# Patient Record
Sex: FEMALE | Race: WHITE | NOT HISPANIC OR LATINO | Employment: OTHER | URBAN - METROPOLITAN AREA
[De-identification: names, ages, dates, MRNs, and addresses within clinical notes are randomized per-mention and may not be internally consistent; named-entity substitution may affect disease eponyms.]

---

## 2021-05-28 ENCOUNTER — PREPPED CHART (OUTPATIENT)
Dept: URBAN - METROPOLITAN AREA CLINIC 19 | Facility: CLINIC | Age: 56
End: 2021-05-28

## 2022-01-20 ASSESSMENT — VISUAL ACUITY
OD_CC: 20/40
OS_CC: 20/30-1

## 2022-01-20 ASSESSMENT — TONOMETRY
OD_IOP_MMHG: 15
OS_IOP_MMHG: 16

## 2022-01-21 ENCOUNTER — FOLLOW UP (OUTPATIENT)
Dept: URBAN - METROPOLITAN AREA CLINIC 19 | Facility: CLINIC | Age: 57
End: 2022-01-21

## 2022-01-21 DIAGNOSIS — H25.13: ICD-10-CM

## 2022-01-21 DIAGNOSIS — H35.373: ICD-10-CM

## 2022-01-21 PROCEDURE — 92134 CPTRZ OPH DX IMG PST SGM RTA: CPT

## 2022-01-21 PROCEDURE — 92014 COMPRE OPH EXAM EST PT 1/>: CPT

## 2022-01-21 ASSESSMENT — VISUAL ACUITY
OD_CC: 20/40-1
OS_CC: 20/25-1
OU_CC: 20/20-1

## 2022-01-21 ASSESSMENT — TONOMETRY
OS_IOP_MMHG: 19
OD_IOP_MMHG: 16

## 2022-02-18 ENCOUNTER — CONSULTATION/EVALUATION (OUTPATIENT)
Dept: URBAN - METROPOLITAN AREA CLINIC 19 | Facility: CLINIC | Age: 57
End: 2022-02-18

## 2022-02-18 DIAGNOSIS — H43.813: ICD-10-CM

## 2022-02-18 DIAGNOSIS — H35.373: ICD-10-CM

## 2022-02-18 DIAGNOSIS — H25.813: ICD-10-CM

## 2022-02-18 PROCEDURE — 92134 CPTRZ OPH DX IMG PST SGM RTA: CPT

## 2022-02-18 PROCEDURE — 92014 COMPRE OPH EXAM EST PT 1/>: CPT

## 2022-02-18 ASSESSMENT — TONOMETRY
OS_IOP_MMHG: 16
OD_IOP_MMHG: 11

## 2022-02-18 ASSESSMENT — VISUAL ACUITY
OS_CC: 20/40
OS_SC: 20/400
OD_CC: 20/40
OD_SC: 20/400
OU_SC: 20/400
OU_CC: 20/40

## 2022-02-18 ASSESSMENT — KERATOMETRY
OD_AXISANGLE_DEGREES: 014
OS_K2POWER_DIOPTERS: 44.50
OD_K1POWER_DIOPTERS: 43.50
OD_K2POWER_DIOPTERS: 44.25
OS_K1POWER_DIOPTERS: 43.25
OS_AXISANGLE2_DEGREES: 75
OS_AXISANGLE_DEGREES: 165
OD_AXISANGLE2_DEGREES: 104

## 2022-02-18 NOTE — PATIENT DISCUSSION
Surgery Counseling: I have discussed the option of glasses versus cataract surgery versus following. It was explained that when the patient’s vision no longer meets their visual needs and a glasses prescription does not improve visual symptoms, the option of cataract surgery is a reasonable next step. It was explained that there is no guarantee that removing the cataract will improve their visual symptoms, however; it is believed that the cataract is contributing to the patient's visual impairment and surgery may improve both the visual and functional status of the patient. The risks, benefits and alternatives of surgery were discussed with the patient. After this discussion, the patient desires to proceed with cataract surgery with implantation of an intraocular lens to improve vision for distance.

## 2022-10-14 ENCOUNTER — CONSULTATION/EVALUATION (OUTPATIENT)
Dept: URBAN - METROPOLITAN AREA CLINIC 19 | Facility: CLINIC | Age: 57
End: 2022-10-14

## 2022-10-14 DIAGNOSIS — H25.813: ICD-10-CM

## 2022-10-14 DIAGNOSIS — H35.373: ICD-10-CM

## 2022-10-14 PROCEDURE — 92136 OPHTHALMIC BIOMETRY: CPT

## 2022-10-14 PROCEDURE — 92134 CPTRZ OPH DX IMG PST SGM RTA: CPT

## 2022-10-14 PROCEDURE — 92014 COMPRE OPH EXAM EST PT 1/>: CPT

## 2022-10-14 ASSESSMENT — KERATOMETRY
OS_AXISANGLE_DEGREES: 165
OD_K1POWER_DIOPTERS: 43.50
OS_AXISANGLE2_DEGREES: 75
OD_AXISANGLE_DEGREES: 014
OS_K2POWER_DIOPTERS: 44.50
OD_K2POWER_DIOPTERS: 44.25
OD_AXISANGLE2_DEGREES: 104
OS_K1POWER_DIOPTERS: 43.25

## 2022-10-14 ASSESSMENT — VISUAL ACUITY
OU_SC: CF 4FT
OS_SC: CF 4FT
OS_CC: 20/40
OD_SC: CF 4FT
OD_CC: 20/40
OU_CC: 20/40

## 2022-10-14 ASSESSMENT — TONOMETRY
OS_IOP_MMHG: 17
OD_IOP_MMHG: 16

## 2022-10-14 NOTE — PATIENT DISCUSSION
The patient was informed that with the Basic option, they will most likely need prescription glasses at all focal points after surgery. The patient elects Basic OU, goal of emmetropia.

## 2022-10-28 ENCOUNTER — PRE-OP/H&P (OUTPATIENT)
Dept: URBAN - METROPOLITAN AREA CLINIC 19 | Facility: CLINIC | Age: 57
End: 2022-10-28

## 2022-10-28 DIAGNOSIS — H25.813: ICD-10-CM

## 2022-10-28 PROCEDURE — 99211PRE PRE OP VISIT

## 2022-11-04 ASSESSMENT — KERATOMETRY
OS_AXISANGLE2_DEGREES: 75
OD_K2POWER_DIOPTERS: 44.25
OD_AXISANGLE2_DEGREES: 104
OS_AXISANGLE_DEGREES: 165
OD_K1POWER_DIOPTERS: 43.50
OS_K1POWER_DIOPTERS: 43.25
OS_K2POWER_DIOPTERS: 44.50
OD_AXISANGLE_DEGREES: 014

## 2022-11-10 ENCOUNTER — POST-OP (OUTPATIENT)
Dept: URBAN - METROPOLITAN AREA CLINIC 20 | Facility: CLINIC | Age: 57
End: 2022-11-10

## 2022-11-10 DIAGNOSIS — Z96.1: ICD-10-CM

## 2022-11-10 PROCEDURE — 99024 POSTOP FOLLOW-UP VISIT: CPT

## 2022-11-10 ASSESSMENT — KERATOMETRY
OD_AXISANGLE2_DEGREES: 104
OS_K1POWER_DIOPTERS: 43.25
OS_K2POWER_DIOPTERS: 44.50
OD_K2POWER_DIOPTERS: 44.25
OS_AXISANGLE2_DEGREES: 75
OD_K1POWER_DIOPTERS: 43.50
OS_AXISANGLE_DEGREES: 165
OD_AXISANGLE_DEGREES: 014

## 2022-11-10 ASSESSMENT — TONOMETRY
OD_IOP_MMHG: 13
OS_IOP_MMHG: 13

## 2022-11-10 ASSESSMENT — VISUAL ACUITY: OS_SC: 20/20-2

## 2022-11-17 ENCOUNTER — POST-OP (OUTPATIENT)
Dept: URBAN - METROPOLITAN AREA CLINIC 20 | Facility: CLINIC | Age: 57
End: 2022-11-17

## 2022-11-17 DIAGNOSIS — Z96.1: ICD-10-CM

## 2022-11-17 PROCEDURE — 99024 POSTOP FOLLOW-UP VISIT: CPT

## 2022-11-17 ASSESSMENT — TONOMETRY
OD_IOP_MMHG: 14
OS_IOP_MMHG: 14

## 2022-11-17 ASSESSMENT — VISUAL ACUITY
OD_SC: 20/50-2
OS_SC: 20/20
OU_SC: 20/20

## 2023-01-10 ENCOUNTER — POST-OP (OUTPATIENT)
Dept: URBAN - METROPOLITAN AREA CLINIC 20 | Facility: CLINIC | Age: 58
End: 2023-01-10

## 2023-01-10 DIAGNOSIS — Z96.1: ICD-10-CM

## 2023-01-10 PROCEDURE — 99024 POSTOP FOLLOW-UP VISIT: CPT

## 2023-01-10 ASSESSMENT — VISUAL ACUITY
OU_SC: 20/20
OS_SC: 20/20
OU_SC: J16
OD_SC: 20/30+2

## 2023-01-10 ASSESSMENT — KERATOMETRY
OD_AXISANGLE_DEGREES: 12
OS_AXISANGLE_DEGREES: 167
OD_K1POWER_DIOPTERS: 43.50
OS_K2POWER_DIOPTERS: 45.00
OD_K2POWER_DIOPTERS: 44.50
OS_AXISANGLE2_DEGREES: 77
OS_K1POWER_DIOPTERS: 43.25
OD_AXISANGLE2_DEGREES: 102

## 2023-01-10 ASSESSMENT — TONOMETRY
OD_IOP_MMHG: 11
OS_IOP_MMHG: 12

## 2023-02-17 NOTE — PATIENT DISCUSSION
12w0d  Returned patient call.  Patient noticed she had a bloody nose. Did resolve quickly afterwards.  States she had this a second time around lunch again. This time it bled again for about 10 minutes. It has resolved     2/2/23:  Platelet Count 150 - 450 10e3/uL 96          Discussed trying saline nasal spray 2-3x/day and sleeping with a humidifier to help keep the nasale passages from being so dry    Routing to Dr Schmidt to advise if any further recommendations would be appropriate  David Bennett RN on 2/17/2023 at 1:26 PM     Discussed that a PVD is a natural aging change of the vitreous where it shrinks and pulls away from the retina. For most people, a PVD is benign event with no symptoms and no vision loss. Others may notice Floaters/Flashes but over time they usually become less noticeable. In a small amount of cases the vitreous can pull too hard from the back of the eye and result in a retinal hole or tear.

## 2023-04-28 ENCOUNTER — FOLLOW UP (OUTPATIENT)
Dept: URBAN - METROPOLITAN AREA CLINIC 20 | Facility: CLINIC | Age: 58
End: 2023-04-28

## 2023-04-28 DIAGNOSIS — H35.373: ICD-10-CM

## 2023-04-28 PROCEDURE — 99024 POSTOP FOLLOW-UP VISIT: CPT

## 2023-04-28 ASSESSMENT — TONOMETRY
OD_IOP_MMHG: 08
OS_IOP_MMHG: 08

## 2023-04-28 ASSESSMENT — VISUAL ACUITY
OD_SC: 20/40-2
OS_SC: 20/30-2

## 2023-05-26 ENCOUNTER — CONTACT LENSES/GLASSES VISIT (OUTPATIENT)
Dept: URBAN - METROPOLITAN AREA CLINIC 20 | Facility: CLINIC | Age: 58
End: 2023-05-26

## 2023-05-26 DIAGNOSIS — H35.373: ICD-10-CM

## 2023-05-26 DIAGNOSIS — Z96.1: ICD-10-CM

## 2023-05-26 DIAGNOSIS — H16.223: ICD-10-CM

## 2023-05-26 DIAGNOSIS — H43.813: ICD-10-CM

## 2023-05-26 DIAGNOSIS — H52.223: ICD-10-CM

## 2023-05-26 PROCEDURE — 99212 OFFICE O/P EST SF 10 MIN: CPT

## 2023-05-26 PROCEDURE — 92015 DETERMINE REFRACTIVE STATE: CPT

## 2023-05-26 ASSESSMENT — VISUAL ACUITY
OD_SC: 20/40
OS_SC: 20/30
OU_SC: 20/25